# Patient Record
Sex: FEMALE | Race: BLACK OR AFRICAN AMERICAN | Employment: FULL TIME | URBAN - METROPOLITAN AREA
[De-identification: names, ages, dates, MRNs, and addresses within clinical notes are randomized per-mention and may not be internally consistent; named-entity substitution may affect disease eponyms.]

---

## 2020-02-06 LAB
ANTIBODY SCREEN, EXTERNAL: NEGATIVE
CHLAMYDIA, EXTERNAL: NEGATIVE
HBSAG, EXTERNAL: NEGATIVE
HIV, EXTERNAL: NORMAL
N. GONORRHEA, EXTERNAL: NEGATIVE
RUBELLA, EXTERNAL: NORMAL
T. PALLIDUM, EXTERNAL: NORMAL
TYPE, ABO & RH, EXTERNAL: NORMAL

## 2020-06-24 LAB — ANTIBODY SCREEN, EXTERNAL: NEGATIVE

## 2020-08-14 LAB — GRBS, EXTERNAL: POSITIVE

## 2020-08-24 ENCOUNTER — HOSPITAL ENCOUNTER (INPATIENT)
Age: 30
LOS: 5 days | Discharge: HOME | End: 2020-08-29
Attending: OBSTETRICS & GYNECOLOGY | Admitting: OBSTETRICS & GYNECOLOGY
Payer: COMMERCIAL

## 2020-08-24 PROBLEM — O14.90 PREECLAMPSIA: Status: ACTIVE | Noted: 2020-08-24

## 2020-08-24 LAB
ALBUMIN SERPL-MCNC: 2.4 G/DL (ref 3.5–5)
ALBUMIN/GLOB SERPL: 0.5 {RATIO} (ref 1.1–2.2)
ALP SERPL-CCNC: 97 U/L (ref 45–117)
ALT SERPL-CCNC: 21 U/L (ref 12–78)
ANION GAP SERPL CALC-SCNC: 6 MMOL/L (ref 5–15)
AST SERPL-CCNC: 24 U/L (ref 15–37)
BASOPHILS # BLD: 0 K/UL (ref 0–0.1)
BASOPHILS NFR BLD: 0 % (ref 0–1)
BILIRUB SERPL-MCNC: 0.2 MG/DL (ref 0.2–1)
BUN SERPL-MCNC: 6 MG/DL (ref 6–20)
BUN/CREAT SERPL: 14 (ref 12–20)
CALCIUM SERPL-MCNC: 8.6 MG/DL (ref 8.5–10.1)
CHLORIDE SERPL-SCNC: 105 MMOL/L (ref 97–108)
CO2 SERPL-SCNC: 25 MMOL/L (ref 21–32)
CREAT SERPL-MCNC: 0.42 MG/DL (ref 0.55–1.02)
DIFFERENTIAL METHOD BLD: ABNORMAL
EOSINOPHIL # BLD: 0.1 K/UL (ref 0–0.4)
EOSINOPHIL NFR BLD: 1 % (ref 0–7)
ERYTHROCYTE [DISTWIDTH] IN BLOOD BY AUTOMATED COUNT: 14.2 % (ref 11.5–14.5)
GLOBULIN SER CALC-MCNC: 4.7 G/DL (ref 2–4)
GLUCOSE SERPL-MCNC: 94 MG/DL (ref 65–100)
HCT VFR BLD AUTO: 35.5 % (ref 35–47)
HGB BLD-MCNC: 11.5 G/DL (ref 11.5–16)
IMM GRANULOCYTES # BLD AUTO: 0.1 K/UL (ref 0–0.04)
IMM GRANULOCYTES NFR BLD AUTO: 1 % (ref 0–0.5)
LDH SERPL L TO P-CCNC: 259 U/L (ref 81–246)
LYMPHOCYTES # BLD: 2 K/UL (ref 0.8–3.5)
LYMPHOCYTES NFR BLD: 19 % (ref 12–49)
MCH RBC QN AUTO: 28.2 PG (ref 26–34)
MCHC RBC AUTO-ENTMCNC: 32.4 G/DL (ref 30–36.5)
MCV RBC AUTO: 87 FL (ref 80–99)
MONOCYTES # BLD: 1 K/UL (ref 0–1)
MONOCYTES NFR BLD: 9 % (ref 5–13)
NEUTS SEG # BLD: 7.5 K/UL (ref 1.8–8)
NEUTS SEG NFR BLD: 70 % (ref 32–75)
NRBC # BLD: 0 K/UL (ref 0–0.01)
NRBC BLD-RTO: 0 PER 100 WBC
PLATELET # BLD AUTO: 241 K/UL (ref 150–400)
PMV BLD AUTO: 9.6 FL (ref 8.9–12.9)
POTASSIUM SERPL-SCNC: 3.4 MMOL/L (ref 3.5–5.1)
PROT SERPL-MCNC: 7.1 G/DL (ref 6.4–8.2)
RBC # BLD AUTO: 4.08 M/UL (ref 3.8–5.2)
SODIUM SERPL-SCNC: 136 MMOL/L (ref 136–145)
URATE SERPL-MCNC: 3.1 MG/DL (ref 2.6–6)
WBC # BLD AUTO: 10.8 K/UL (ref 3.6–11)

## 2020-08-24 PROCEDURE — 65410000002 HC RM PRIVATE OB

## 2020-08-24 PROCEDURE — 85025 COMPLETE CBC W/AUTO DIFF WBC: CPT

## 2020-08-24 PROCEDURE — 84550 ASSAY OF BLOOD/URIC ACID: CPT

## 2020-08-24 PROCEDURE — 83615 LACTATE (LD) (LDH) ENZYME: CPT

## 2020-08-24 PROCEDURE — 36415 COLL VENOUS BLD VENIPUNCTURE: CPT

## 2020-08-24 PROCEDURE — 4A1HXCZ MONITORING OF PRODUCTS OF CONCEPTION, CARDIAC RATE, EXTERNAL APPROACH: ICD-10-PCS | Performed by: OBSTETRICS & GYNECOLOGY

## 2020-08-24 PROCEDURE — 80053 COMPREHEN METABOLIC PANEL: CPT

## 2020-08-24 PROCEDURE — 75410000002 HC LABOR FEE PER 1 HR

## 2020-08-24 RX ORDER — LABETALOL 200 MG/1
200 TABLET, FILM COATED ORAL 2 TIMES DAILY
Status: ON HOLD | COMMUNITY
End: 2020-08-29 | Stop reason: SDUPTHER

## 2020-08-24 RX ORDER — SODIUM CHLORIDE 0.9 % (FLUSH) 0.9 %
5-40 SYRINGE (ML) INJECTION EVERY 8 HOURS
Status: DISCONTINUED | OUTPATIENT
Start: 2020-08-24 | End: 2020-08-28

## 2020-08-24 RX ORDER — SODIUM CHLORIDE 0.9 % (FLUSH) 0.9 %
5-40 SYRINGE (ML) INJECTION AS NEEDED
Status: DISCONTINUED | OUTPATIENT
Start: 2020-08-24 | End: 2020-08-29 | Stop reason: HOSPADM

## 2020-08-24 RX ORDER — LABETALOL 200 MG/1
200 TABLET, FILM COATED ORAL DAILY
Status: DISCONTINUED | OUTPATIENT
Start: 2020-08-25 | End: 2020-08-27

## 2020-08-24 RX ORDER — SODIUM CHLORIDE, SODIUM LACTATE, POTASSIUM CHLORIDE, CALCIUM CHLORIDE 600; 310; 30; 20 MG/100ML; MG/100ML; MG/100ML; MG/100ML
125 INJECTION, SOLUTION INTRAVENOUS CONTINUOUS
Status: DISCONTINUED | OUTPATIENT
Start: 2020-08-24 | End: 2020-08-29 | Stop reason: HOSPADM

## 2020-08-24 RX ORDER — NALOXONE HYDROCHLORIDE 0.4 MG/ML
0.4 INJECTION, SOLUTION INTRAMUSCULAR; INTRAVENOUS; SUBCUTANEOUS AS NEEDED
Status: DISCONTINUED | OUTPATIENT
Start: 2020-08-24 | End: 2020-08-26 | Stop reason: HOSPADM

## 2020-08-24 NOTE — PROGRESS NOTES
1925-Bedside report from [address] [place] [address], care assumed at this time. Patient may come off monitor per MD.    1930-Patient off monitor,  BPM. Assessment done, patient states no new complaints. All questions answered at this time. 2245-Patient placed on monitor for NST.     2305-Patient off monitor, NST reactive.  BPM.     0610-Shane balloon removed at this time. Patient up to shower. 0652-Patient placed back on monitor    0705-Report to A [facility], care turned over at this time.

## 2020-08-24 NOTE — H&P
History & Physical    Name: Alexa Allen MRN: [id number]  SSN: [id number]    YOB: 1990  Age: 31 y.o. Sex: female      Subjective:     Estimated Date of Delivery: None noted. OB History    Para Term  AB Living   1             SAB TAB Ectopic Molar Multiple Live Births                    # Outcome Date GA Lbr Jacinto/2nd Weight Sex Delivery Anes PTL Lv   1 Current                Ms. Livan Angel is a [id number] admitted with pregnancy at 37w0d for induction of labor due to [facility] with superimposed preeclampsia without severe features. She denies contractions, vaginal bleeding, LOF, HA, changes in vision, RUQ pain, CP, or SOB. Reports good fetal movement     Prenatal course has been complicated by  - cHTN - labetalol 200mg BID started at 8 weeks  - superimposed preE without severe features diagnosed at 30wks  - anemia on PO iron  - rubella non-immune  - obesity prepregnancy BMI 40.2 with 22lb gain    Please see prenatal records for details. No past medical history on file. No past surgical history on file. Social History     Occupational History    Not on file   Tobacco Use    Smoking status: Not on file   Substance and Sexual Activity    Alcohol use: Not on file    Drug use: Not on file    Sexual activity: Not on file     No family history on file. No Known Allergies  Prior to Admission medications    Not on File        Review of Systems: A comprehensive review of systems was negative except for that written in the History of Present Illness. Objective:     Vitals:  Vitals:    20 1732 20 1737 20 1802   BP: 134/83  136/84   Pulse: 83  75   SpO2: 95% 98% 99%        Physical Exam:  Patient without distress.   Heart: Regular rate and rhythm  Lung: normal respiratory effort  Abdomen: soft, nontender, gravid  Cervical Exam: /-3, midposition, moderate   Lower Extremities: no lower extremity swelling or calf tenderness  Membranes:  Intact  Fetal Heart Rate: 150 / moderate variability / + accels / no decels, cat I        [place]: rare ctx less than 1 in 10 min, pt denies    US @ 35wks EFW 2641g (55th%ile), anterior placenta    Prenatal Labs:   No results found for: ABORH, RUBELLAEXT, HBSAGEXT, HIVEXT, RPREXT, GONNOEXT, CHLAMEXT, ABORHEXT, RUBELLAEXT, GRBSEXT, HBSAGEXT, HIVEXT, RPREXT, GONNOEXT, CHLAMEXT, ABORHEXT, RUBELLAEXT, GRBSEXT, HBSAGEXT, HIVEXT, RPREXT, GONNOEXT, CHLAMEXT    Impression/Plan:     Active Problems:    Preeclampsia (8/24/2020)     31 y.o. [id number] at 37w0d presenting for IOL for cHTN with superimposed preeclampsia without severe features. GBS positive. FHR reactive. Maternal/fetal status reassuring. Plan: Admit for induction of labor.   - CBC, CMP, uric acid, LDH, STBB  - Group B Strep positive, will treat prophylactically with penicillin.   - Cervical ripening balloon placed, filled to 60cc  - EFM/toco per protocol  - close BP monitoring   - regular diet overnight, then CLD  - plan pitocin in AM or when balloon out  - epidural as desired  - continue to follow closely

## 2020-08-24 NOTE — PROGRESS NOTES
Pt  MIKHAIL 2020 of Dr. Nathan Haynes arrives to L&D for scheduled \"induction for pre- e. \" Pt denies vaginal bleeding or LOF; reports some clear discharge and +FM. 1725: SVE by Dr. Kati Morris at this time; pt 1cm/50%/-1.    1800: Cervical ripening balloon placed at this time by Dr. Kati Morris. : Off going SBAR report given to Nolan Vela RN.

## 2020-08-25 ENCOUNTER — ANESTHESIA EVENT (OUTPATIENT)
Dept: LABOR AND DELIVERY | Age: 30
End: 2020-08-25
Payer: COMMERCIAL

## 2020-08-25 ENCOUNTER — ANESTHESIA (OUTPATIENT)
Dept: LABOR AND DELIVERY | Age: 30
End: 2020-08-25
Payer: COMMERCIAL

## 2020-08-25 PROCEDURE — 74011250637 HC RX REV CODE- 250/637: Performed by: OBSTETRICS & GYNECOLOGY

## 2020-08-25 PROCEDURE — 10907ZC DRAINAGE OF AMNIOTIC FLUID, THERAPEUTIC FROM PRODUCTS OF CONCEPTION, VIA NATURAL OR ARTIFICIAL OPENING: ICD-10-PCS | Performed by: OBSTETRICS & GYNECOLOGY

## 2020-08-25 PROCEDURE — 74011000250 HC RX REV CODE- 250: Performed by: ANESTHESIOLOGY

## 2020-08-25 PROCEDURE — 75410000002 HC LABOR FEE PER 1 HR

## 2020-08-25 PROCEDURE — 74011250636 HC RX REV CODE- 250/636: Performed by: OBSTETRICS & GYNECOLOGY

## 2020-08-25 PROCEDURE — 00HU33Z INSERTION OF INFUSION DEVICE INTO SPINAL CANAL, PERCUTANEOUS APPROACH: ICD-10-PCS | Performed by: ANESTHESIOLOGY

## 2020-08-25 PROCEDURE — 77030014125 HC TY EPDRL BBMI -B: Performed by: ANESTHESIOLOGY

## 2020-08-25 PROCEDURE — 65410000002 HC RM PRIVATE OB

## 2020-08-25 PROCEDURE — 3E033VJ INTRODUCTION OF OTHER HORMONE INTO PERIPHERAL VEIN, PERCUTANEOUS APPROACH: ICD-10-PCS | Performed by: OBSTETRICS & GYNECOLOGY

## 2020-08-25 PROCEDURE — 74011250636 HC RX REV CODE- 250/636: Performed by: ANESTHESIOLOGY

## 2020-08-25 PROCEDURE — 74011000258 HC RX REV CODE- 258: Performed by: OBSTETRICS & GYNECOLOGY

## 2020-08-25 PROCEDURE — 74011250636 HC RX REV CODE- 250/636

## 2020-08-25 RX ORDER — BUPIVACAINE HYDROCHLORIDE 2.5 MG/ML
INJECTION, SOLUTION EPIDURAL; INFILTRATION; INTRACAUDAL
Status: COMPLETED
Start: 2020-08-25 | End: 2020-08-25

## 2020-08-25 RX ORDER — BUPIVACAINE HYDROCHLORIDE 2.5 MG/ML
INJECTION, SOLUTION EPIDURAL; INFILTRATION; INTRACAUDAL AS NEEDED
Status: DISCONTINUED | OUTPATIENT
Start: 2020-08-25 | End: 2020-08-26 | Stop reason: HOSPADM

## 2020-08-25 RX ORDER — FENTANYL/BUPIVACAINE/NS/PF 2-1250MCG
1-16 PREFILLED PUMP RESERVOIR EPIDURAL CONTINUOUS
Status: DISCONTINUED | OUTPATIENT
Start: 2020-08-25 | End: 2020-08-26 | Stop reason: HOSPADM

## 2020-08-25 RX ORDER — SODIUM CHLORIDE 0.9 % (FLUSH) 0.9 %
5-40 SYRINGE (ML) INJECTION EVERY 8 HOURS
Status: DISCONTINUED | OUTPATIENT
Start: 2020-08-25 | End: 2020-08-26 | Stop reason: HOSPADM

## 2020-08-25 RX ORDER — SODIUM CHLORIDE 0.9 % (FLUSH) 0.9 %
5-40 SYRINGE (ML) INJECTION AS NEEDED
Status: DISCONTINUED | OUTPATIENT
Start: 2020-08-25 | End: 2020-08-26 | Stop reason: HOSPADM

## 2020-08-25 RX ORDER — OXYTOCIN/0.9 % SODIUM CHLORIDE 30/500 ML
0-25 PLASTIC BAG, INJECTION (ML) INTRAVENOUS
Status: DISCONTINUED | OUTPATIENT
Start: 2020-08-25 | End: 2020-08-29 | Stop reason: HOSPADM

## 2020-08-25 RX ORDER — BUPIVACAINE HYDROCHLORIDE AND EPINEPHRINE 5; 5 MG/ML; UG/ML
INJECTION, SOLUTION EPIDURAL; INTRACAUDAL; PERINEURAL
Status: DISCONTINUED
Start: 2020-08-25 | End: 2020-08-25 | Stop reason: WASHOUT

## 2020-08-25 RX ORDER — FENTANYL CITRATE 50 UG/ML
INJECTION, SOLUTION INTRAMUSCULAR; INTRAVENOUS
Status: DISPENSED
Start: 2020-08-25 | End: 2020-08-25

## 2020-08-25 RX ORDER — FENTANYL CITRATE 50 UG/ML
INJECTION, SOLUTION INTRAMUSCULAR; INTRAVENOUS
Status: COMPLETED
Start: 2020-08-25 | End: 2020-08-25

## 2020-08-25 RX ORDER — OXYTOCIN/0.9 % SODIUM CHLORIDE 30/500 ML
PLASTIC BAG, INJECTION (ML) INTRAVENOUS
Status: COMPLETED
Start: 2020-08-25 | End: 2020-08-25

## 2020-08-25 RX ORDER — FENTANYL CITRATE 50 UG/ML
INJECTION, SOLUTION INTRAMUSCULAR; INTRAVENOUS AS NEEDED
Status: DISCONTINUED | OUTPATIENT
Start: 2020-08-25 | End: 2020-08-26 | Stop reason: HOSPADM

## 2020-08-25 RX ADMIN — Medication 28 MILLI-UNITS/MIN: at 17:24

## 2020-08-25 RX ADMIN — AMPICILLIN SODIUM 2 G: 2 INJECTION, POWDER, FOR SOLUTION INTRAMUSCULAR; INTRAVENOUS at 21:39

## 2020-08-25 RX ADMIN — SODIUM CHLORIDE, SODIUM LACTATE, POTASSIUM CHLORIDE, AND CALCIUM CHLORIDE 125 ML/HR: 600; 310; 30; 20 INJECTION, SOLUTION INTRAVENOUS at 20:46

## 2020-08-25 RX ADMIN — FENTANYL CITRATE 100 MCG: 50 INJECTION, SOLUTION INTRAMUSCULAR; INTRAVENOUS at 11:25

## 2020-08-25 RX ADMIN — Medication 12 ML/HR: at 12:03

## 2020-08-25 RX ADMIN — SODIUM CHLORIDE, SODIUM LACTATE, POTASSIUM CHLORIDE, AND CALCIUM CHLORIDE 125 ML/HR: 600; 310; 30; 20 INJECTION, SOLUTION INTRAVENOUS at 05:45

## 2020-08-25 RX ADMIN — BUPIVACAINE HYDROCHLORIDE 0.6 ML: 2.5 INJECTION, SOLUTION EPIDURAL; INFILTRATION; INTRACAUDAL; PERINEURAL at 11:23

## 2020-08-25 RX ADMIN — SODIUM CHLORIDE 115.2 MG: 9 INJECTION, SOLUTION INTRAVENOUS at 22:25

## 2020-08-25 RX ADMIN — SODIUM CHLORIDE, SODIUM LACTATE, POTASSIUM CHLORIDE, AND CALCIUM CHLORIDE 999 ML/HR: 600; 310; 30; 20 INJECTION, SOLUTION INTRAVENOUS at 10:50

## 2020-08-25 RX ADMIN — SODIUM CHLORIDE 2.5 MILLION UNITS: 9 INJECTION, SOLUTION INTRAVENOUS at 18:14

## 2020-08-25 RX ADMIN — SODIUM CHLORIDE, SODIUM LACTATE, POTASSIUM CHLORIDE, AND CALCIUM CHLORIDE 125 ML/HR: 600; 310; 30; 20 INJECTION, SOLUTION INTRAVENOUS at 11:49

## 2020-08-25 RX ADMIN — LABETALOL HYDROCHLORIDE 200 MG: 200 TABLET, FILM COATED ORAL at 09:01

## 2020-08-25 RX ADMIN — Medication 10 ML: at 17:55

## 2020-08-25 RX ADMIN — BUPIVACAINE HYDROCHLORIDE 3 ML: 2.5 INJECTION, SOLUTION EPIDURAL; INFILTRATION; INTRACAUDAL; PERINEURAL at 11:25

## 2020-08-25 RX ADMIN — BUPIVACAINE HYDROCHLORIDE 3 ML: 2.5 INJECTION, SOLUTION EPIDURAL; INFILTRATION; INTRACAUDAL; PERINEURAL at 11:24

## 2020-08-25 RX ADMIN — Medication 10 ML: at 09:02

## 2020-08-25 RX ADMIN — SODIUM CHLORIDE 5 MILLION UNITS: 900 INJECTION, SOLUTION INTRAVENOUS at 05:45

## 2020-08-25 RX ADMIN — Medication 2 MILLI-UNITS/MIN: at 08:07

## 2020-08-25 RX ADMIN — SODIUM CHLORIDE 2.5 MILLION UNITS: 9 INJECTION, SOLUTION INTRAVENOUS at 14:16

## 2020-08-25 RX ADMIN — Medication 12 ML/HR: at 18:47

## 2020-08-25 RX ADMIN — SODIUM CHLORIDE 2.5 MILLION UNITS: 9 INJECTION, SOLUTION INTRAVENOUS at 10:20

## 2020-08-25 NOTE — PROGRESS NOTES
S: Comfortable with epidural    O:   Visit Vitals  /69   Pulse 76   Temp 98.4 °F (36.9 °C)   Resp 18   Ht 5' 2\" (1.575 m)   Wt 116.6 kg (257 lb)   SpO2 96%   Breastfeeding Yes   BMI 47.01 kg/m²     SVE: 3/thick/high (unchanged from prior). IUPC and FSE placed. Cat 1 tracing  Pit @ 16    A/P: 31 y.o.  at 37w1d, here for IOL for SIP without SF.    -Continue pitocin per protocol  -Continuous monitoring  -GBS+, on PCN  -Continue to monitor BPs and continue home labetalol 200 BID.

## 2020-08-25 NOTE — PROGRESS NOTES
S: Comfortable    O:   Visit Vitals  /79   Pulse 89   Temp 97.7 °F (36.5 °C)   Resp 18   Ht 5' 2\" (1.575 m)   Wt 116.6 kg (257 lb)   SpO2 97%   Breastfeeding Yes   BMI 47.01 kg/m²     SVE: deferred (previously 3 cm, attempted AROM at last check)  Cat 1 tracing  Pit @ 2    A/P: 31 y.o.  at 37w1d, here for IOL for SIP without SF.  -Continue pitocin per protocol  -epidural when desired  -Continuous monitoring  -GBS+, s/p 1 dose of PCN  -Continue to monitor BPs and continue home labetalol 200 BID.

## 2020-08-25 NOTE — PROGRESS NOTES
SBAR report received from NURIA Calle RN. Assumed care of patient. Patient placed in position of comfort. Call bell in reach. 0732: SVE by Dr. Reena Rivera at this time; pt 3cm/50%; attempted AROM, will monitor for LOF.    0736: US by Dr. Reena Rivera at this time; baby vertex. 0840: Dr. Ananya Nielsen at bedside to assess pt and discuss plan of care a this time. 1120: Dr. Mary Ellen Salgado at bedside to consent pt and place epidural.    1122: Time out for epidural at this time; prior to epidural.    1145: SVE by Dr. Ananya Nielsen at this time; pt 3cm. 1430: Dr. Ananya Nielsen at bedside for SVE; pt 3cm; IUPC placed as well. 1437: FSE placed by Dr. Ananya Nielsen at this time. 1814: SVE by Dr. Anita Curry at this time; pt 3cm. Per Dr. Ananya Nielsen continue pitocin at a rate of 30 mL/hr and if MVUs not adequate at 7pm pt may have pitocin break and eat at this time.

## 2020-08-25 NOTE — PROGRESS NOTES
Pt examined at 0730 hrs. Cx 3/50/-1/post. AROM attempted and unsuccessful. Pitocin started.  Cat 1 , RNST

## 2020-08-25 NOTE — PROGRESS NOTES
7:07 PM  Bedside shift change report given to Jalen Lewis RN (oncoming nurse) by Ted Andres RN (offgoing nurse). Report included the following information SBAR, Kardex, Intake/Output, MAR and Recent Results. 7:13 PM  Pitocin stopped for 1-2 hours as ordered and will allow pt to eat per Dr Angelic Jurado. 9:44 PM  Dr Jet Quiroz in with pt. Strip reviewed. SVE performed, no cervical change noted and IUPC placed. 4:05 AM  Dr Jet Quiroz called to come for delivery. 4:07 AM  Dr Jet Quiroz at bedside for delivery. 4:10 AM  Pt began pushing. 4:12 AM   of live female infant. Nuchal x 1, reduced by Dr Jet Quiroz.

## 2020-08-25 NOTE — PROGRESS NOTES
S: Comfortable with epidural    O:   Visit Vitals  /70   Pulse 78   Temp 98.9 °F (37.2 °C)   Resp 18   Ht 5' 2\" (1.575 m)   Wt 116.6 kg (257 lb)   SpO2 97%   Breastfeeding Yes   BMI 47.01 kg/m²     SVE: 3/thick/high (unchanged from prior). IUPC and FSE in place  Cat 1 tracing  Pit @ 30    A/P: 31 y.o.  at 37w1d, here for IOL for SIP without SF.    -Continue pitocin per protocol. May do pitocin break at 7 if MVUs remain inadequate.   -Continuous monitoring  -GBS+, on PCN  -Continue to monitor BPs and continue home labetalol 200 BID.

## 2020-08-25 NOTE — PROGRESS NOTES
S: Comfortable with epidural    O:   Visit Vitals  /84   Pulse 96   Temp 98.2 °F (36.8 °C)   Resp 20   Ht 5' 2\" (1.575 m)   Wt 116.6 kg (257 lb)   SpO2 97%   Breastfeeding Yes   BMI 47.01 kg/m²     SVE: 3/thick/high, noted copious clear fluid leaking. Cat 1 tracing  Pit @ 8    A/P: 31 y.o.  at 37w1d, here for IOL for SIP without SF.    -Continue pitocin per protocol  -Continuous monitoring  -GBS+, on PCN  -Continue to monitor BPs and continue home labetalol 200 BID.

## 2020-08-25 NOTE — ANESTHESIA PREPROCEDURE EVALUATION
Relevant Problems   No relevant active problems       Anesthetic History   No history of anesthetic complications            Review of Systems / Medical History  Patient summary reviewed, nursing notes reviewed and pertinent labs reviewed    Pulmonary                Comments: Former smoker - 2.5 pack years   Neuro/Psych   Within defined limits           Cardiovascular    Hypertension              Exercise tolerance: >4 METS  Comments: Preeclampsia   GI/Hepatic/Renal  Within defined limits              Endo/Other        Morbid obesity     Other Findings            Physical Exam    Airway  Mallampati: III  TM Distance: > 6 cm  Neck ROM: normal range of motion   Mouth opening: Normal     Cardiovascular  Regular rate and rhythm,  S1 and S2 normal,  no murmur, click, rub, or gallop             Dental  No notable dental hx       Pulmonary  Breath sounds clear to auscultation               Abdominal  GI exam deferred       Other Findings            Anesthetic Plan    ASA: 3  Anesthesia type: CSE            Anesthetic plan and risks discussed with: Patient

## 2020-08-25 NOTE — ANESTHESIA PROCEDURE NOTES
Epidural Block    Start time: 8/25/2020 11:19 AM  End time: 8/25/2020 11:27 AM  Performed by: Benny Bates MD  Authorized by: Benny Bates MD     Pre-Procedure  Indication: labor epidural    Preanesthetic Checklist: risks and benefits discussed, site marked and timeout performed    Timeout Time: 11:19        Epidural:   Patient position:  Seated  Prep region:  Lumbar  Prep: DuraPrep    Location:  L3-4    Needle and Epidural Catheter:   Needle Type:  Tuohy  Needle Gauge:  17 G  Injection Technique:  Loss of resistance using saline  Attempts:  1  Catheter Size:  19 G  Catheter at Skin Depth (cm):  11  Depth in Epidural Space (cm):  4  Events: no blood with aspiration, no cerebrospinal fluid with aspiration, no paresthesia and negative aspiration test    Test Dose:  Bupivacaine 0.25% w/ epi and negative    Assessment:   Catheter Secured:  Tegaderm and tape  Insertion:  Uncomplicated  Patient tolerance:  Patient tolerated the procedure well with no immediate complications  Spinal portion:    A 25 g pencil point spinal needle was placed through the Touhy x1 attempt until CSF was obtained. 0.6 mL 0.25% bupivacaine with 1:200K epinephrine was deposited into the CSF. -paresthesia.

## 2020-08-26 LAB
ANION GAP SERPL CALC-SCNC: 7 MMOL/L (ref 5–15)
BUN SERPL-MCNC: 4 MG/DL (ref 6–20)
BUN/CREAT SERPL: 10 (ref 12–20)
CALCIUM SERPL-MCNC: 8.4 MG/DL (ref 8.5–10.1)
CHLORIDE SERPL-SCNC: 104 MMOL/L (ref 97–108)
CO2 SERPL-SCNC: 23 MMOL/L (ref 21–32)
CREAT SERPL-MCNC: 0.41 MG/DL (ref 0.55–1.02)
GLUCOSE SERPL-MCNC: 78 MG/DL (ref 65–100)
POTASSIUM SERPL-SCNC: 3.5 MMOL/L (ref 3.5–5.1)
SODIUM SERPL-SCNC: 134 MMOL/L (ref 136–145)

## 2020-08-26 PROCEDURE — 80048 BASIC METABOLIC PNL TOTAL CA: CPT

## 2020-08-26 PROCEDURE — 65410000002 HC RM PRIVATE OB

## 2020-08-26 PROCEDURE — 36415 COLL VENOUS BLD VENIPUNCTURE: CPT

## 2020-08-26 PROCEDURE — 75410000000 HC DELIVERY VAGINAL/SINGLE

## 2020-08-26 PROCEDURE — 75410000003 HC RECOV DEL/VAG/CSECN EA 0.5 HR

## 2020-08-26 PROCEDURE — 74011250637 HC RX REV CODE- 250/637: Performed by: OBSTETRICS & GYNECOLOGY

## 2020-08-26 PROCEDURE — 76060000078 HC EPIDURAL ANESTHESIA

## 2020-08-26 PROCEDURE — 75410000002 HC LABOR FEE PER 1 HR

## 2020-08-26 RX ORDER — ONDANSETRON 4 MG/1
4 TABLET, ORALLY DISINTEGRATING ORAL
Status: ACTIVE | OUTPATIENT
Start: 2020-08-26 | End: 2020-08-27

## 2020-08-26 RX ORDER — NALOXONE HYDROCHLORIDE 0.4 MG/ML
0.4 INJECTION, SOLUTION INTRAMUSCULAR; INTRAVENOUS; SUBCUTANEOUS AS NEEDED
Status: DISCONTINUED | OUTPATIENT
Start: 2020-08-26 | End: 2020-08-29 | Stop reason: HOSPADM

## 2020-08-26 RX ORDER — ADHESIVE BANDAGE
30 BANDAGE TOPICAL DAILY PRN
Status: DISCONTINUED | OUTPATIENT
Start: 2020-08-26 | End: 2020-08-29 | Stop reason: HOSPADM

## 2020-08-26 RX ORDER — OXYTOCIN/RINGER'S LACTATE 20/1000 ML
125-500 PLASTIC BAG, INJECTION (ML) INTRAVENOUS ONCE
Status: ACTIVE | OUTPATIENT
Start: 2020-08-26 | End: 2020-08-26

## 2020-08-26 RX ORDER — HYDROCORTISONE ACETATE PRAMOXINE HCL 2.5; 1 G/100G; G/100G
CREAM TOPICAL AS NEEDED
Status: DISCONTINUED | OUTPATIENT
Start: 2020-08-26 | End: 2020-08-29 | Stop reason: HOSPADM

## 2020-08-26 RX ORDER — DIPHENHYDRAMINE HCL 25 MG
25 CAPSULE ORAL
Status: DISCONTINUED | OUTPATIENT
Start: 2020-08-26 | End: 2020-08-29 | Stop reason: HOSPADM

## 2020-08-26 RX ORDER — SIMETHICONE 80 MG
80 TABLET,CHEWABLE ORAL
Status: DISCONTINUED | OUTPATIENT
Start: 2020-08-26 | End: 2020-08-29 | Stop reason: HOSPADM

## 2020-08-26 RX ORDER — ZOLPIDEM TARTRATE 5 MG/1
5 TABLET ORAL
Status: DISCONTINUED | OUTPATIENT
Start: 2020-08-26 | End: 2020-08-29 | Stop reason: HOSPADM

## 2020-08-26 RX ORDER — OXYCODONE AND ACETAMINOPHEN 5; 325 MG/1; MG/1
2 TABLET ORAL
Status: DISCONTINUED | OUTPATIENT
Start: 2020-08-26 | End: 2020-08-29 | Stop reason: HOSPADM

## 2020-08-26 RX ORDER — IBUPROFEN 400 MG/1
800 TABLET ORAL EVERY 8 HOURS
Status: DISCONTINUED | OUTPATIENT
Start: 2020-08-26 | End: 2020-08-29 | Stop reason: HOSPADM

## 2020-08-26 RX ORDER — ACETAMINOPHEN 325 MG/1
650 TABLET ORAL
Status: DISCONTINUED | OUTPATIENT
Start: 2020-08-26 | End: 2020-08-29 | Stop reason: HOSPADM

## 2020-08-26 RX ADMIN — LABETALOL HYDROCHLORIDE 200 MG: 200 TABLET, FILM COATED ORAL at 09:02

## 2020-08-26 RX ADMIN — IBUPROFEN 800 MG: 400 TABLET, FILM COATED ORAL at 16:47

## 2020-08-26 RX ADMIN — Medication 10 ML: at 05:06

## 2020-08-26 RX ADMIN — IBUPROFEN 800 MG: 400 TABLET, FILM COATED ORAL at 06:17

## 2020-08-26 RX ADMIN — Medication 12 ML/HR: at 02:03

## 2020-08-26 NOTE — PROGRESS NOTES
7:20 AM  Bedside shift change report given to Lizandro Colvin RN (oncoming nurse) by Elver Liriano RN (offgoing nurse). Report included the following information SBAR, Kardex, Intake/Output, MAR and Recent Results.

## 2020-08-26 NOTE — PROGRESS NOTES
Labor Progress Note  Patient seen, fetal heart rate and contraction pattern evaluated. Patient reports she is comfortable with epidural.  Pitocin stopped per Dr Sobia Noel plan. Patient to have supper, then restart pitocin. Physical Exam:  Visit Vitals  /86 (BP 1 Location: Left arm, BP Patient Position: Hip tilt, left; At rest)   Pulse 93   Temp 98.1 °F (36.7 °C)   Resp 18   Ht 5' 2\" (1.575 m)   Wt 116.6 kg (257 lb)   SpO2 99%   Breastfeeding Yes   BMI 47.01 kg/m²     Cervical Exam: 3-4/70/-2/vertex  Membranes:  ryptured  Uterine Activity: Frequency: 3-4 minutes minutes  Fetal Heart Rate: 150 - 160,  adequate variability and reactivity  Accelerations: yes  Decelerations:occasional brief variable    Assessment/Plan:  Reassuring fetal status. Will restart pitocin after 1-2 hours. IUPC removed, plan to replace when pitocin restarted    BILLY Mendez MD

## 2020-08-26 NOTE — L&D DELIVERY NOTE
Delivery Summary  Patient: Bryn Henderson             Additional Delivery Comments - Patient was admitted 2 days prior to delivery for IOL. She had ICB placed overnight, and the follow-ing morning had AROM and augmentation of contractions. Epidural analgesia was placed. Fetal heart tones were Cat 1 and Cat 2 throughout the course of labor. The patient eventually progressed into the first stage, then progressed through the second stage to a vaginal delivery. When the fetal vertex approached the perinuem, the patient was prepared for delivery. The baby was delivered without difficulty over intact perineum, a tight nuchal cord reduced on the perineum. She was bulb suctioned, became active and crying, and was placed on the maternal abdomen. The cord was clamped and cut, and then the placenta delivered spontaneously, intact. The fundus became firm, the cervix and sulci were inspected and appeared to be intact. Vaginal exam revealed no evidence of laceration, hematoma formation or foreign bodies. Sponge and sharps count was correct. The procedure was tolerated adequately by the patient and she is recovering in stable condition.       Information for the patient's :  José Luis Dowell [[id number]]     Delivery Type: Vaginal, Spontaneous   Delivery Date: 2020   Delivery Time: 4:12 AM     Birth Weight: 2.405 kg     Sex:  female  Delivery Clinician:  Luis Daniel Torres   Gestational Age: 42w2d    Presentation: Vertex   Position: Middle  Occiput  Anterior     Apgars were 8  and 9      Resuscitation Method: Tactile Stimulation     Meconium Stained: None    Living Status: Living       Placenta Date/Time:     Placenta Removal: Spontaneous   Placenta Appearance: Intact    Cord Information:      Cord Events: Nuchal Cord With Compressions       Disposition of Cord Blood: Lab    Blood Gases Sent?:  No     Episiotomy: None   Laceration(s):      Estimated Blood Loss (ml): 250    Labor Events  Method: Oxytocin;AROM Augmentation: None   Cervical Ripenin2020  6:00 PM  Kannan

## 2020-08-26 NOTE — PROGRESS NOTES
Bedside shift change report given to NIEVES Gambino RN (oncoming nurse) by [employer] (offgoing nurse). Report included the following information SBAR, Intake/Output, MAR and Recent Results.

## 2020-08-26 NOTE — ADT AUTH CERT NOTES
Facility Name: [facility]           
  
  
  
  
  
   
Patient Demographics Patient Name Daja eWbber  Sex Female    
1990  Address [address] [place]  [address] [place] [address]  Phone [phone] (Home) [phone] (Mobile) Hospital Account Name  Acct ID  Class  Status  Primary Coverage Daja Webber  [id number]  INPATIENT  Open  BLUE CROSS - [facility]   
  
    
Guarantor Account (for Hospital Account [de-identified]) Name  Relation to Pt  Service Area  Active? Acct Type Daja Webber  Self  [address]  Yes  Personal/Family Address  Phone [address] [place]  [place], [address]  [phone](Y)     
  
    
Coverage Information (for Hospital Account [de-identified]) F/O Payor/Plan  Subscriber   Subscriber Sex  Precert # BLUE CROSS/VA BLUE CROSS OUT OF STATE  88  F Subscriber  Subscriber # Daja Webber  [id number] Grp #  Group Name Address  Phone [id number] [facility], [address] Policy Number  Status  Effective Date  Benefits Phone [phone]  -   - Auth/Cert LMN#[id number]   
  
    
Admission Information Arrival Date/Time:  2020 1707  Admit Date/Time:  2020 1707  IP Adm. Date/Time:  2020 4038 Admission Type:  Urgent  Point of Origin:  Non-health Care Facility/self  Admit Category:    
Means of Arrival:   Primary Service:  Obstetrics  Secondary Service:  N/A Transfer Source:   Service Area:  [address] [place] [address]  Unit:  [facility] 3 LABOR & DELIVERY Admit Provider:  Tiffanie Camargo MD  Attending Provider:  Narinder Kemp MD  Referring Provider:    
Admission Information Attending Provider  Admission Dx  Admitted on   
Narinder Kemp MD  Preeclampsia  20 Service  Isolation  Code Status OBSTETRICS   Full Code Allergies  Advance Care Planning No Known Allergies  Jump to the Activity     
Admission Information Unit/Bed:  [facility] 3 LABOR & DELIVERY/  Service:  OBSTETRICS Admitting provider:  Kristel Pacheco MD  Phone:  [phone] Attending provider:  Patel Deluca MD  Phone:  [phone] PCP:  Patel Deluca MD  Phone:  [phone] Admission dx:  L&D  Patient class:  I Admission type:  UR     
 
 
MOM CHART-- 
 
Susanne Madrigal  
  MRN: [id number] [employer] Comment Last edited by  on  at [de-identified] name  MRN  Account    Age  Sex  Admission Type   
Elizabet Carrasco Large  [id number]  [id number]  20  0 days  F  Confirmed Admission - L&D [place] OB History [place] [place] 3 Para 3 Term 1    
    
 AB   
    
 Living 3 SAB   
    
 TAB   
    
 Ectopic   
    
 Molar   
    
 Multiple   
0 Live Births 1   
  
   
#  Outcome  Date  GA  Labor/2nd  Weight  Sex  Delivery  Anes  PTL  Lv  A1  A5   
1  Para 2  Para 3  Term  20  37w2d  20h 35m / 0h 05m  2.405 kg  F  Vag-Spont  Epidural  N  Living  8  9 Name: Myra Donovan Location: Other Delivering Clinician: Jesse Andrade MD   
  
Prenatal History Most Recent Value Did You Receive Prenatal Care  Yes [facility] Provider  Aurora Husbands Seen By MFM (Maternal Fetal Medicine)? No   
Fetal Ultrasound Abnormalities/Concerns? No   
Infant Feeding  Breast Milk Circumcision Planned  No   
Pediatrician After Birth/ Follow Up Baby Visits  DE [facility]. Dating Summary Working MIKHAIL: 20  set by Marlon Tran RN on 20 based on Other Basis Based On  MIKHAIL  GA Dif  GA  User  Date Other Basis  20  Working   Marlon Tran RN  20 Prenatal Results Prenatal Labs Test  Value  Date  Time ABO/Rh  * O positive  20 Antibody Scrn  * Negative  20 Hgb Hct Platelet Rubella  * .97equivocal  20 RPR      
T. Pallidum Antibody  * Non-reactive  20 Urine Hep B Surf AG  * Negative  20 Hep C      
HIV  * Non-reactive  20 Gonorrhea  * Negative  20 Chlamydia  * Negative  20 TSH      
GTT, 1 HR (Glucola) GTT, Fasting GTT, 1 HR      
GTT, 2 HR      
GTT, 3 HR      
3rd Trimester Test  Value  Date  Time Hgb Hct Platelet Group B Strep  * Positive  20 Antibody Scrn  * Negative  20 TSH      
T. Pallidum Antibody Hep B Surf AG Gonorrhea Chlamydia Screening/Diagnostics Test  Value  Date  Time   
AFP Only AFP Tetra Hgb Electrophoresis Amniocentesis Cystic Fibrosis Thalessemia Simone-Sac Mac Amaury PAP Smear FREE BETA HCG      
NT      
NIPT Additional Results Test  Value  Date  Time GTT, Fasting GTT, 1HR      
GTT, 2HR      
GTT, 3HR      
RPR FREE BETA HCG      
CMV AB      
TOXOPLASMA AB      
VARICELLA ZOSTER AB Legend *: Historical  
View all results for this pregnancy Antonette Mendoza, [address] [place] [address] [[id number]] [place] Delivery Birth date/time: 2020 04:12:58 Delivery type: Vaginal, Spontaneous Labor Event Times Labor onset date/time: 2020 3632 Dilation complete date/time: 2020 0407 Start pushing date/time: 2020 0410 Labor Events  labor?: No  
 steroids?: None Cervical ripenin2020 1800 Cervical ripening type: Shane/EASI Antibiotics during labor?: Yes Rupture date/time: 2020 2315 Rupture type: AROM Fluid color: Clear Fluid odor: None Induction: Oxytocin, AROM Induction date/time: 2020 0732 Induction indications: Chronic Hypertension, Mild Preeclampsia, Other(comment) Augmentation: None Labor/Delivery complications: None Delivery Providers Delivering clinician: Marlon Tyler MD  
Provider  Role Marlon Tyler MD  Obstetrician Alycia Chu, RN  Primary Nurse Marito Chi RN  Primary  Nurse Antonio John RN  Charge Nurse Asim Batista  Student Nurse [facility] Anesthesia Method: Epidural  
Multiple Birth Onset Second Stage Second Stage Start Date: 20  Second Stage Start Time: 2502 Operative Delivery Forceps attempted?: No  
Vacuum extractor attempted?: No  
Presentation Presentation: Vertex Position: Middle Occiput Anterior Apgars Living status: Living Apgars:   1 min. :   5 min.:   10 min. :   15 min.:   20 min.:    
Skin color:   0  1 Heart rate:   2  2 Reflex irritability:   2  2 Muscle tone:   2  2 Respiratory effort:   2  2 Total:   8  9 Apgars assigned by: NURIA WARNER RN Resuscitation Method: Tactile Stimulation Shoulder Dystocia Shoulder dystocia present?: No  
Measurements Weight: 2405 g Length: 48.3 cm Head circumference: 30 cm Chest circumference: 29 cm Abdominal girth: 26 cm Lacerations Episiotomy: None Repair Needed: None # of Repair Packets:   
Suture Type and Size:   
Suture Comment:   
Estimated Blood Loss (mL): 250 Placenta Placenta Date/Time: 2020 7142 Removal: Spontaneous Appearance: Intact  Placenta disposition: Discarded Vaginal Counts Initial count personnel: CEE/ OPHELIA Final count personnel: CEE/ BARAK-LANCE Accurate final count?: Yes Delivery Summary History Event  User  Date/Time Signed  Antonio John RN  2020 04:30:05 Opened for Addendum  Phil Pedraza  2020 46:56:30 Signed  Alycia Chu RN  2020 05:27:02

## 2020-08-26 NOTE — ANESTHESIA POSTPROCEDURE EVALUATION
* No procedures listed *. CSE    Anesthesia Post Evaluation      Multimodal analgesia: multimodal analgesia used between 6 hours prior to anesthesia start to PACU discharge  Patient location during evaluation: bedside  Patient participation: complete - patient participated  Level of consciousness: awake  Pain management: adequate  Airway patency: patent  Anesthetic complications: no  Cardiovascular status: acceptable  Respiratory status: acceptable  Hydration status: acceptable  Post anesthesia nausea and vomiting:  controlled  Final Post Anesthesia Temperature Assessment:  Normothermia (36.0-37.5 degrees C)      INITIAL Post-op Vital signs:   Vitals Value Taken Time   /81 8/26/2020  6:17 AM   Temp     Pulse 82 8/26/2020  6:17 AM   Resp     SpO2 96 % 8/26/2020  6:21 AM   Vitals shown include unvalidated device data.

## 2020-08-27 PROCEDURE — 74011250637 HC RX REV CODE- 250/637: Performed by: OBSTETRICS & GYNECOLOGY

## 2020-08-27 PROCEDURE — 65410000002 HC RM PRIVATE OB

## 2020-08-27 RX ORDER — LABETALOL 200 MG/1
200 TABLET, FILM COATED ORAL 3 TIMES DAILY
Status: DISCONTINUED | OUTPATIENT
Start: 2020-08-27 | End: 2020-08-28

## 2020-08-27 RX ORDER — NIFEDIPINE 10 MG/1
20 CAPSULE ORAL ONCE
Status: COMPLETED | OUTPATIENT
Start: 2020-08-27 | End: 2020-08-27

## 2020-08-27 RX ORDER — NIFEDIPINE 10 MG/1
10 CAPSULE ORAL ONCE
Status: COMPLETED | OUTPATIENT
Start: 2020-08-27 | End: 2020-08-27

## 2020-08-27 RX ADMIN — NIFEDIPINE 20 MG: 10 CAPSULE ORAL at 18:59

## 2020-08-27 RX ADMIN — LABETALOL HYDROCHLORIDE 200 MG: 200 TABLET, FILM COATED ORAL at 09:01

## 2020-08-27 RX ADMIN — NIFEDIPINE 10 MG: 10 CAPSULE ORAL at 16:51

## 2020-08-27 RX ADMIN — IBUPROFEN 800 MG: 400 TABLET, FILM COATED ORAL at 09:01

## 2020-08-27 RX ADMIN — IBUPROFEN 800 MG: 400 TABLET, FILM COATED ORAL at 00:19

## 2020-08-27 RX ADMIN — IBUPROFEN 800 MG: 400 TABLET, FILM COATED ORAL at 20:42

## 2020-08-27 RX ADMIN — LABETALOL HYDROCHLORIDE 200 MG: 200 TABLET, FILM COATED ORAL at 15:44

## 2020-08-27 NOTE — PROGRESS NOTES
Post-Partum Day Number 1 Progress Note    Lizeth García     Assessment: Doing well, post partum day 1    Plan:  1. Continue routine postpartum and perineal care as well as maternal education. 2. Induction for superimposed pre-eclampsia w/o severe features, on Labetalol 200 BID for CHTN- normmal to mild range BPs- Will monitor blood pressure and treat any severe range BPs  3. She would like early discharge this afternoon if able. Will monitor BPs this am and if remain normal to mild may consider d/c this afternoon with follow up early next week for BP check  Information for the patient's :  Katy Mcintyre [[id number]]   Vaginal, Spontaneous    Patient doing well without significant complaint. Voiding without difficulty, normal lochia. Vitals:  Visit Vitals  BP (!) 133/91   Pulse 75   Temp 98.5 °F (36.9 °C)   Resp 16   Ht 5' 2\" (1.575 m)   Wt 116.6 kg (257 lb)   SpO2 95%   Breastfeeding Yes   BMI 47.01 kg/m²     Temp (24hrs), Av.2 °F (36.8 °C), Min:97.8 °F (36.6 °C), Max:98.5 °F (36.9 °C)        Exam:   Patient without distress. Abdomen soft, fundus firm, nontender                Perineum with normal lochia noted. Lower extremities are negative for swelling, cords or tenderness. Labs:     Lab Results   Component Value Date/Time    WBC 10.8 2020 05:41 PM    HGB 11.5 2020 05:41 PM    HCT 35.5 2020 05:41 PM    PLATELET 438  05:41 PM       No results found for this or any previous visit (from the past 24 hour(s)).

## 2020-08-27 NOTE — PROGRESS NOTES
Patient with several BPs readings in the severe range 160s/110s. She is asymptomatic and denies H/A or vision changes. No CP/SOB. She has been on Labetalol 200 BID for chronic hypertension although she did not get a dose last night. She did get Labetalol 200 mg this am. She received a 2nd dose of 200 mg of Labetalol about an hour ago but BP has remained in severe range. Will give her one dose of Nifedipine 10 mg now and recheck BP and adjust medications as needed. She does not currently have IV access but is BPs remain in severe range she may need IV placed for IV anti-hypertensives.

## 2020-08-27 NOTE — PROGRESS NOTES
Dr Dieter Giang notified of BP of 150/92. Will recheck in 4 hours, will not discharge today, and will continue to monitor. 1530- blood pressure 148/101. Dr Dieter Giang notified. Labetolol increased to 200 mg 3 times a day. Will give a dose now and recheck blood pressure in an hour    1545- labetolol dose given    1640- blood pressure re-check 168/111. Aftab Truong notified. One time dose of Nifedipine ordered. 1650- Nifedipine given and per Dr Dieter Giang will recheck blood pressure in 30 min. 1725- blood pressure 145/91. Dr Dieter Giang notified. Will recheck another one in 1 hour and as long as it is ok will continue to monitor blood pressures ever 4 hours tonight. 200- Dr Burton Bland notified of blood pressure of 148/101. No new orders at this time.

## 2020-08-28 PROCEDURE — 74011250637 HC RX REV CODE- 250/637: Performed by: OBSTETRICS & GYNECOLOGY

## 2020-08-28 PROCEDURE — 65410000002 HC RM PRIVATE OB

## 2020-08-28 RX ORDER — LABETALOL 100 MG/1
100 TABLET, FILM COATED ORAL ONCE
Status: COMPLETED | OUTPATIENT
Start: 2020-08-28 | End: 2020-08-28

## 2020-08-28 RX ORDER — NIFEDIPINE 30 MG/1
30 TABLET, EXTENDED RELEASE ORAL DAILY
Status: DISCONTINUED | OUTPATIENT
Start: 2020-08-28 | End: 2020-08-28

## 2020-08-28 RX ADMIN — LABETALOL HYDROCHLORIDE 200 MG: 200 TABLET, FILM COATED ORAL at 00:08

## 2020-08-28 RX ADMIN — IBUPROFEN 800 MG: 400 TABLET, FILM COATED ORAL at 14:59

## 2020-08-28 RX ADMIN — LABETALOL HYDROCHLORIDE 200 MG: 200 TABLET, FILM COATED ORAL at 14:58

## 2020-08-28 RX ADMIN — IBUPROFEN 800 MG: 400 TABLET, FILM COATED ORAL at 22:58

## 2020-08-28 RX ADMIN — LABETALOL HYDROCHLORIDE 200 MG: 200 TABLET, FILM COATED ORAL at 09:28

## 2020-08-28 RX ADMIN — LABETALOL HYDROCHLORIDE 300 MG: 200 TABLET, FILM COATED ORAL at 22:58

## 2020-08-28 RX ADMIN — LABETALOL HYDROCHLORIDE 100 MG: 100 TABLET, FILM COATED ORAL at 16:52

## 2020-08-28 NOTE — PROGRESS NOTES
Bp rechecked post labetalol 130/79, per Dr Octavia Rothman, do not give procardia ordered at this time. Recheck BP at 1400 or there about. Give labetalol 1600 dose around 1500. Then recheck BP at 1600 to see if pt will be able to go home today.

## 2020-08-28 NOTE — PROGRESS NOTES
Post-Partum Day Number 2 Progress Note    Tea Martini     Assessment: Doing well, post partum day 2 from  p IOL for SIP at 37 weeks. On labetalol 200 TID and received procardia IR 10, 20 yesterday for severe range BPs. This am, BP high-mild, but before morning labetalol given. Plan:  1. Continue routine postpartum and perineal care as well as maternal education. 2. Recheck BP 1 hour after labetalol. If remains elevated, will start Procardia 30XL. Information for the patient's :  Francis Ma [[id number]]   Vaginal, Spontaneous      Patient doing well without significant complaint. Voiding without difficulty, normal lochia. Ambulating, tolerating a diet. Denies HA, vision changes, RUQ pain. Vitals:  Visit Vitals  BP (!) 158/96   Pulse 80   Temp 98.3 °F (36.8 °C)   Resp 16   Ht 5' 2\" (1.575 m)   Wt 116.6 kg (257 lb)   SpO2 95%   Breastfeeding Yes   BMI 47.01 kg/m²     Temp (24hrs), Av.2 °F (36.8 °C), Min:98.1 °F (36.7 °C), Max:98.3 °F (36.8 °C)        Exam:   Patient without distress. Abdomen soft, fundus firm, nontender                Perineum with normal lochia noted. Lower extremities are negative for swelling, cords or tenderness. Labs:     No results found for this or any previous visit (from the past 24 hour(s)).

## 2020-08-28 NOTE — DISCHARGE INSTRUCTIONS

## 2020-08-28 NOTE — PROGRESS NOTES
Patient with high-mild BP again this afternoon 1 hour after taking labetalol 200. Will give an additional 100 mg now, then titrate to 300 mg TID. Plan to keep patient admitted one additional day due to elevated BPs.

## 2020-08-29 VITALS
WEIGHT: 257 LBS | OXYGEN SATURATION: 95 % | BODY MASS INDEX: 47.29 KG/M2 | HEART RATE: 79 BPM | RESPIRATION RATE: 16 BRPM | TEMPERATURE: 98.6 F | HEIGHT: 62 IN | DIASTOLIC BLOOD PRESSURE: 90 MMHG | SYSTOLIC BLOOD PRESSURE: 137 MMHG

## 2020-08-29 PROCEDURE — 74011250636 HC RX REV CODE- 250/636: Performed by: OBSTETRICS & GYNECOLOGY

## 2020-08-29 PROCEDURE — 90707 MMR VACCINE SC: CPT | Performed by: OBSTETRICS & GYNECOLOGY

## 2020-08-29 PROCEDURE — 74011250637 HC RX REV CODE- 250/637: Performed by: OBSTETRICS & GYNECOLOGY

## 2020-08-29 RX ORDER — DOCUSATE SODIUM 100 MG/1
100 CAPSULE, LIQUID FILLED ORAL
Qty: 60 CAP | Refills: 0 | Status: SHIPPED | OUTPATIENT
Start: 2020-08-29

## 2020-08-29 RX ORDER — LABETALOL 300 MG/1
300 TABLET, FILM COATED ORAL EVERY 8 HOURS
Qty: 270 TAB | Refills: 0 | Status: SHIPPED | OUTPATIENT
Start: 2020-08-29 | End: 2020-11-27

## 2020-08-29 RX ORDER — IBUPROFEN 600 MG/1
600 TABLET ORAL
Qty: 60 TAB | Refills: 0 | Status: SHIPPED | OUTPATIENT
Start: 2020-08-29

## 2020-08-29 RX ADMIN — IBUPROFEN 800 MG: 400 TABLET, FILM COATED ORAL at 06:53

## 2020-08-29 RX ADMIN — LABETALOL HYDROCHLORIDE 300 MG: 200 TABLET, FILM COATED ORAL at 06:52

## 2020-08-29 RX ADMIN — MEASLES, MUMPS, AND RUBELLA VIRUS VACCINE LIVE 0.5 ML: 1000; 12500; 1000 INJECTION, POWDER, LYOPHILIZED, FOR SUSPENSION SUBCUTANEOUS at 12:15

## 2020-08-29 NOTE — ROUTINE PROCESS
2045 
Pt /89. Dr Maryjane Bowden informed. No new orders at this time.
ANN-MARIEAR to NIEVES Lam 

Bedside and Verbal shift change report given to GAGE Escobedo RN (oncoming nurse) by ANGELITA Denton (offgoing nurse). Report included the following information SBAR, Kardex, Procedure Summary, Intake/Output, MAR and Recent Results.
Bedside and Verbal shift change report given to SEMAJ Ann RN (oncoming nurse) by ANGELITA Small (offgoing nurse). Report included the following information SBAR, Kardex, OR Summary, Procedure Summary, Intake/Output, MAR and Recent Results.
Change of shift report received from NIEVES Woodruff RN in [employer].
Change of shift report received from SEMAJ Quintana in [employer].
Discharge instructions reviewed and signed. Patient acknowledges understanding. Prescriptions given and paperwork signed. Copy of discharge instructions given to patient. Follow up with OB in 1 week.
Verbal shift change report given to NIEVES Le RN (oncoming nurse) by Rafita Lan. Isidoro Devi RN (offgoing nurse). Report included the following information SBAR, Kardex, Intake/Output, MAR, and Recent Results.
I have personally performed a face to face diagnostic evaluation on this patient. I have reviewed the PA note and agree with the history, exam, and plan of care, except as noted.

## 2020-08-29 NOTE — PROGRESS NOTES
Post-Partum Day Number 3 Progress Note    Lizeth García     Assessment: Doing well, post partum day 3 from  p IOL for SIP at 37 weeks. On labetalol 300 TID, BP normal-mild, asymptomatic. Plan:   1. Discharge home today  2. Follow up in office in 1 wk for BP check and  6 weeks with Becki OSUNA  3. Post partum activity advised, diet as tolerated  4. Discharge Medications: ibuprofen, PNV, colace, labetalol and medications prior to admission    Information for the patient's :  Francis Ma [[id number]]   Vaginal, Spontaneous      Patient doing well without significant complaint. Voiding without difficulty, normal lochia. Ambulating, tolerating a diet. Denies HA, vision changes, RUQ pain. Vitals:  Visit Vitals  /90 (BP 1 Location: Left arm, BP Patient Position: At rest)   Pulse 79   Temp 98.6 °F (37 °C)   Resp 16   Ht 5' 2\" (1.575 m)   Wt 116.6 kg (257 lb)   SpO2 95%   Breastfeeding Yes   BMI 47.01 kg/m²     Temp (24hrs), Av.4 °F (36.9 °C), Min:97.8 °F (36.6 °C), Max:99.6 °F (37.6 °C)      Exam:         Patient without distress. Abdomen soft, fundus firm, nontender                 Lower extremities are negative for swelling, cords or tenderness. Labs:       No results found for this or any previous visit (from the past 24 hour(s)).

## 2020-08-29 NOTE — DISCHARGE SUMMARY
Obstetrical Discharge Summary     Name: Yudy Goodrich MRN: [id number]  SSN: [id number]    YOB: 1990  Age: 31 y.o. Sex: female      Admit Date: 2020    Discharge Date: 2020     Admitting Physician: Andrade Devi MD     Attending Physician:  Amira Smoker*     Admission Diagnoses: Preeclampsia [O14.90]    Procedure Performed:  * No surgery found *     Discharge Diagnoses:   Information for the patient's :  Shobha Traylor [[id number]]   Delivery of a 2.405 kg female infant via Vaginal, Spontaneous on 2020 at 4:12 AM  by Abad Zamudio. Apgars were 8  and 9 . Additional Diagnoses:   Hospital Problems  Date Reviewed: 2020          Codes Class Noted POA    Preeclampsia ICD-10-CM: O14.90  ICD-9-CM: 642.40  2020 Unknown             Lab Results   Component Value Date/Time    Rubella, External .97equivocal 2020    GrBStrep, External Positive 2020       Hospital Course:  p IOL at 37 weeks for SIP. Admitted until PPD3 due to elevated BPs. Discharged home with labetalol 300 mg TID with close f/u and precautions reviewed. Patient Disposition: Home      Followup Care:  Discharge Condition: good  No sex for 6 weeks  Regular Diet  Ice to area for comfort    Patient Instructions:   Current Discharge Medication List      START taking these medications    Details   ibuprofen (MOTRIN) 600 mg tablet Take 1 Tab by mouth every six (6) hours as needed for Pain. Qty: 60 Tab, Refills: 0      docusate sodium (Colace) 100 mg capsule Take 1 Cap by mouth two (2) times daily as needed for Constipation for up to 30 doses. Qty: 60 Cap, Refills: 0         CONTINUE these medications which have CHANGED    Details   labetaloL (NORMODYNE) 300 mg tablet Take 1 Tab by mouth every eight (8) hours for 90 days.   Qty: 270 Tab, Refills: 0         CONTINUE these medications which have NOT CHANGED    Details   PNV GA.91/XPZWBME fum/folic ac (PRENATAL PO) Take 1 Tab by mouth daily. ferrous sulfate (IRON PO) Take 1 Tab by mouth daily. Reference my discharge instructions. Follow-up Appointments   Procedures    FOLLOW UP VISIT Appointment in: One Week     Standing Status:   Standing     Number of Occurrences:   1     Order Specific Question:   Appointment in     Answer:    One Week        Signed By:  Yamilex Cramer MD     August 29, 2020